# Patient Record
Sex: FEMALE | URBAN - METROPOLITAN AREA
[De-identification: names, ages, dates, MRNs, and addresses within clinical notes are randomized per-mention and may not be internally consistent; named-entity substitution may affect disease eponyms.]

---

## 2020-06-08 ENCOUNTER — OFFICE VISIT (OUTPATIENT)
Dept: URBAN - METROPOLITAN AREA CLINIC 40 | Facility: CLINIC | Age: 80
End: 2020-06-08
Payer: COMMERCIAL

## 2020-06-08 DIAGNOSIS — E11.9 TYPE 2 DIABETES MELLITUS W/O COMPLICATION: Primary | ICD-10-CM

## 2020-06-08 PROCEDURE — 92014 COMPRE OPH EXAM EST PT 1/>: CPT | Performed by: OPTOMETRIST

## 2020-06-08 ASSESSMENT — KERATOMETRY
OD: 42.75
OS: 41.63

## 2020-06-08 ASSESSMENT — INTRAOCULAR PRESSURE
OS: 18
OD: 18

## 2020-06-08 ASSESSMENT — VISUAL ACUITY
OS: 20/70
OD: 20/70

## 2020-06-08 NOTE — IMPRESSION/PLAN
Impression: Type 2 diabetes mellitus w/o complication: Q59.0. Plan: Diabetes type II: no background retinopathy, no signs of neovascularization noted. Discussed ocular and systemic benefits of blood sugar control.

## 2020-06-08 NOTE — IMPRESSION/PLAN
Impression: Age-related nuclear cataract, bilateral: H25.13. Plan: Cataracts account for the patient's complaints. Discussed all risks, benefits, procedures and recovery. Patient understands changing glasses will not improve vision. Patient desires to have surgery, recommend phacoemulsification with intraocular lens.

## 2020-06-23 ENCOUNTER — OFFICE VISIT (OUTPATIENT)
Dept: URBAN - METROPOLITAN AREA CLINIC 40 | Facility: CLINIC | Age: 80
End: 2020-06-23
Payer: COMMERCIAL

## 2020-06-23 PROCEDURE — 99204 OFFICE O/P NEW MOD 45 MIN: CPT | Performed by: OPHTHALMOLOGY

## 2020-06-23 ASSESSMENT — INTRAOCULAR PRESSURE
OD: 17
OS: 17

## 2020-06-23 ASSESSMENT — KERATOMETRY
OS: 41.50
OD: 42.13

## 2020-06-23 NOTE — IMPRESSION/PLAN
Impression: Age-related nuclear cataract, bilateral: H25.13. Evangelist Velazquez Visually significant, quality of life issue, could improve with surgery. Plan: Cataracts account for the patient's complaints. Discussed all risks, benefits, alternatives, procedures and recovery. Patient understands changing glasses will not improve vision. Patient desires to have surgery, recommend phacoemulsification with intraocular lens implant OD.  lvl 2 - pt declines premium IOL -  Re-evaluate OS for possible cataract surgery after OD is done.

## 2020-07-06 ENCOUNTER — TESTING ONLY (OUTPATIENT)
Dept: URBAN - METROPOLITAN AREA CLINIC 40 | Facility: CLINIC | Age: 80
End: 2020-07-06
Payer: COMMERCIAL

## 2020-07-06 DIAGNOSIS — H25.13 AGE-RELATED NUCLEAR CATARACT, BILATERAL: Primary | ICD-10-CM

## 2020-07-06 PROCEDURE — 76519 ECHO EXAM OF EYE: CPT | Performed by: OPHTHALMOLOGY

## 2020-07-06 RX ORDER — PREDNISOLONE ACETATE 10 MG/ML
1 % SUSPENSION/ DROPS OPHTHALMIC
Qty: 10 | Refills: 1 | Status: INACTIVE
Start: 2020-08-11 | End: 2020-09-07

## 2020-07-06 RX ORDER — OFLOXACIN 3 MG/ML
0.3 % SOLUTION/ DROPS OPHTHALMIC
Qty: 5 | Refills: 1 | Status: INACTIVE
Start: 2020-07-06 | End: 2020-08-03

## 2020-07-06 RX ORDER — PREDNISOLONE ACETATE 10 MG/ML
1 % SUSPENSION/ DROPS OPHTHALMIC
Qty: 10 | Refills: 1 | Status: INACTIVE
Start: 2020-07-06 | End: 2020-08-24

## 2020-07-06 RX ORDER — OFLOXACIN 3 MG/ML
0.3 % SOLUTION/ DROPS OPHTHALMIC
Qty: 5 | Refills: 1 | Status: INACTIVE
Start: 2020-08-09 | End: 2020-08-17

## 2020-07-06 RX ORDER — KETOROLAC TROMETHAMINE 5 MG/ML
0.5 % SOLUTION OPHTHALMIC
Qty: 10 | Refills: 1 | Status: INACTIVE
Start: 2020-08-11 | End: 2020-09-07

## 2020-07-06 RX ORDER — KETOROLAC TROMETHAMINE 5 MG/ML
0.5 % SOLUTION OPHTHALMIC
Qty: 10 | Refills: 1 | Status: INACTIVE
Start: 2020-07-06 | End: 2020-08-24

## 2020-07-06 ASSESSMENT — PACHYMETRY
OS: 23.58
OD: 2.60
OD: 23.49
OS: 2.81

## 2020-07-27 ENCOUNTER — SURGERY (OUTPATIENT)
Dept: URBAN - METROPOLITAN AREA SURGERY 11 | Facility: SURGERY | Age: 80
End: 2020-07-27
Payer: COMMERCIAL

## 2020-07-27 PROCEDURE — 66984 XCAPSL CTRC RMVL W/O ECP: CPT | Performed by: OPHTHALMOLOGY

## 2020-07-28 ENCOUNTER — POST-OPERATIVE VISIT (OUTPATIENT)
Dept: URBAN - METROPOLITAN AREA CLINIC 40 | Facility: CLINIC | Age: 80
End: 2020-07-28
Payer: COMMERCIAL

## 2020-07-28 DIAGNOSIS — Z09 ENCNTR FOR F/U EXAM AFT TRTMT FOR COND OTH THAN MALIG NEOPLM: Primary | ICD-10-CM

## 2020-07-28 PROCEDURE — 99024 POSTOP FOLLOW-UP VISIT: CPT | Performed by: OPTOMETRIST

## 2020-07-28 ASSESSMENT — INTRAOCULAR PRESSURE
OS: 15
OD: 14

## 2020-08-04 ENCOUNTER — OFFICE VISIT (OUTPATIENT)
Dept: URBAN - METROPOLITAN AREA CLINIC 40 | Facility: CLINIC | Age: 80
End: 2020-08-04
Payer: COMMERCIAL

## 2020-08-04 DIAGNOSIS — Z96.1 PRESENCE OF INTRAOCULAR LENS: ICD-10-CM

## 2020-08-04 DIAGNOSIS — H25.12 AGE-RELATED NUCLEAR CATARACT, LEFT EYE: Primary | ICD-10-CM

## 2020-08-04 PROCEDURE — 99213 OFFICE O/P EST LOW 20 MIN: CPT | Performed by: OPHTHALMOLOGY

## 2020-08-04 ASSESSMENT — INTRAOCULAR PRESSURE
OD: 17
OS: 17

## 2020-08-04 ASSESSMENT — VISUAL ACUITY
OD: 20/40
OS: 20/70

## 2020-08-04 NOTE — IMPRESSION/PLAN
Impression: Age-related nuclear cataract, left eye: H25.12. Mabel Gardner Visually significant, quality of life issue, could improve with surgery. Plan: Cataracts account for the patient's complaints. Discussed all risks, benefits, alternatives, procedures and recovery. Patient understands changing glasses will not improve vision. Patient desires to have surgery, recommend phacoemulsification with intraocular lens implant OS.   lvl 2 - pt declines premium IOL -

## 2020-08-10 ENCOUNTER — SURGERY (OUTPATIENT)
Dept: URBAN - METROPOLITAN AREA SURGERY 11 | Facility: SURGERY | Age: 80
End: 2020-08-10
Payer: COMMERCIAL

## 2020-08-10 PROCEDURE — 66984 XCAPSL CTRC RMVL W/O ECP: CPT | Performed by: OPHTHALMOLOGY

## 2020-08-11 ENCOUNTER — POST-OPERATIVE VISIT (OUTPATIENT)
Dept: URBAN - METROPOLITAN AREA CLINIC 40 | Facility: CLINIC | Age: 80
End: 2020-08-11
Payer: COMMERCIAL

## 2020-08-11 PROCEDURE — 99024 POSTOP FOLLOW-UP VISIT: CPT | Performed by: OPTOMETRIST

## 2020-08-11 ASSESSMENT — INTRAOCULAR PRESSURE
OS: 18
OD: 16

## 2020-08-19 ENCOUNTER — POST-OPERATIVE VISIT (OUTPATIENT)
Dept: URBAN - METROPOLITAN AREA CLINIC 40 | Facility: CLINIC | Age: 80
End: 2020-08-19
Payer: COMMERCIAL

## 2020-08-19 PROCEDURE — 99024 POSTOP FOLLOW-UP VISIT: CPT | Performed by: OPTOMETRIST

## 2020-08-19 ASSESSMENT — VISUAL ACUITY
OD: 20/25
OS: 20/25

## 2020-08-19 ASSESSMENT — INTRAOCULAR PRESSURE
OD: 14
OS: 13

## 2020-09-10 ENCOUNTER — OFFICE VISIT (OUTPATIENT)
Dept: URBAN - METROPOLITAN AREA CLINIC 40 | Facility: CLINIC | Age: 80
End: 2020-09-10
Payer: COMMERCIAL

## 2020-09-10 DIAGNOSIS — H52.4 PRESBYOPIA: Primary | ICD-10-CM

## 2020-09-10 PROCEDURE — 92014 COMPRE OPH EXAM EST PT 1/>: CPT | Performed by: OPTOMETRIST

## 2020-09-10 ASSESSMENT — VISUAL ACUITY
OD: 20/25
OS: 20/20

## 2022-05-03 ENCOUNTER — OFFICE VISIT (OUTPATIENT)
Dept: URBAN - METROPOLITAN AREA CLINIC 40 | Facility: CLINIC | Age: 82
End: 2022-05-03
Payer: COMMERCIAL

## 2022-05-03 DIAGNOSIS — E11.9 TYPE 2 DIABETES MELLITUS W/O COMPLICATION: Primary | ICD-10-CM

## 2022-05-03 DIAGNOSIS — H04.123 DRY EYE SYNDROME OF BILATERAL LACRIMAL GLANDS: ICD-10-CM

## 2022-05-03 DIAGNOSIS — H26.493 OTHER SECONDARY CATARACT, BILATERAL: ICD-10-CM

## 2022-05-03 PROCEDURE — 99214 OFFICE O/P EST MOD 30 MIN: CPT | Performed by: OPTOMETRIST

## 2022-05-03 ASSESSMENT — VISUAL ACUITY
OS: 20/30
OD: 20/40

## 2022-05-03 ASSESSMENT — KERATOMETRY
OD: 41.50
OS: 41.50

## 2022-05-03 ASSESSMENT — INTRAOCULAR PRESSURE
OS: 12
OD: 13

## 2023-09-11 ENCOUNTER — OFFICE VISIT (OUTPATIENT)
Dept: URBAN - METROPOLITAN AREA CLINIC 40 | Facility: CLINIC | Age: 83
End: 2023-09-11
Payer: COMMERCIAL

## 2023-09-11 DIAGNOSIS — H43.811 VITREOUS DEGENERATION, RIGHT EYE: ICD-10-CM

## 2023-09-11 DIAGNOSIS — H52.4 PRESBYOPIA: ICD-10-CM

## 2023-09-11 DIAGNOSIS — E11.9 TYPE 2 DIABETES MELLITUS WITHOUT COMPLICATIONS: Primary | ICD-10-CM

## 2023-09-11 PROCEDURE — 99214 OFFICE O/P EST MOD 30 MIN: CPT | Performed by: OPTOMETRIST

## 2023-09-11 ASSESSMENT — VISUAL ACUITY
OD: 20/25
OS: 20/30

## 2023-09-11 ASSESSMENT — INTRAOCULAR PRESSURE
OS: 15
OD: 16

## 2025-05-29 ENCOUNTER — HOSPITAL ENCOUNTER (OUTPATIENT)
Age: 85
Discharge: HOME | End: 2025-05-29
Attending: NURSE PRACTITIONER

## 2025-05-29 VITALS
RESPIRATION RATE: 20 BRPM | TEMPERATURE: 98 F | OXYGEN SATURATION: 100 % | HEART RATE: 72 BPM | DIASTOLIC BLOOD PRESSURE: 62 MMHG | SYSTOLIC BLOOD PRESSURE: 182 MMHG

## 2025-05-29 DIAGNOSIS — R03.0 ELEVATED BLOOD PRESSURE READING: ICD-10-CM

## 2025-05-29 DIAGNOSIS — N30.01 ACUTE CYSTITIS WITH HEMATURIA: ICD-10-CM

## 2025-05-29 DIAGNOSIS — R30.0 DYSURIA: Primary | ICD-10-CM

## 2025-05-29 LAB
GLUCOSE UR STRIP-MCNC: NEGATIVE MG/DL
KETONES UR STRIP-MCNC: 15 MG/DL
NITRITE UR QL STRIP: NEGATIVE
PH UR STRIP: 5.5 [PH]
PROT UR STRIP-MCNC: >=300 MG/DL
SP GR UR STRIP: 1.02
UROBILINOGEN UR STRIP-ACNC: <2 MG/DL

## 2025-05-29 PROCEDURE — 87077 CULTURE AEROBIC IDENTIFY: CPT | Performed by: NURSE PRACTITIONER

## 2025-05-29 PROCEDURE — 87186 SC STD MICRODIL/AGAR DIL: CPT | Performed by: NURSE PRACTITIONER

## 2025-05-29 PROCEDURE — 81002 URINALYSIS NONAUTO W/O SCOPE: CPT | Performed by: NURSE PRACTITIONER

## 2025-05-29 PROCEDURE — 87086 URINE CULTURE/COLONY COUNT: CPT | Performed by: NURSE PRACTITIONER

## 2025-05-29 PROCEDURE — 99204 OFFICE O/P NEW MOD 45 MIN: CPT | Performed by: NURSE PRACTITIONER

## 2025-05-29 RX ORDER — CEPHALEXIN 500 MG/1
500 CAPSULE ORAL 2 TIMES DAILY
Qty: 14 CAPSULE | Refills: 0 | Status: SHIPPED | OUTPATIENT
Start: 2025-05-29 | End: 2025-06-05

## 2025-05-29 NOTE — ED PROVIDER NOTES
Patient Seen in: [facility]        History  Chief Complaint   Patient presents with    Urinary Symptoms     Stated Complaint: urinary symptoms    Subjective:   Well-appearing 84-year-old female with diabetes mellitus, essential hypertension and hyperlipidemia presents with complaints of pain with urination, urinary urgency and frequency for the past 2 days.  Patient communicates that she noticed blood-streaked urine with wiping today morning.  Patient denies gross hematuria or clots.  Patient denies fever or chills.  Patient denies abdominal or back pain.  Patient communicates normal appetite/p.o. intake.  Patient does communicate that she did not take her daily dose of lisinopril today morning.              Objective:     Past Medical History:    Diabetes (HCC)    Essential hypertension    Hyperlipidemia              Past Surgical History:   Procedure Laterality Date    Breast surgery      Bilateral masectomy 11/18/18, Breast CA                Social History     Socioeconomic History    Marital status:    Tobacco Use    Smoking status: Never     Passive exposure: Never    Smokeless tobacco: Never              Review of Systems    Positive for stated complaint: urinary symptoms  Other systems are as noted in HPI.  Constitutional and vital signs reviewed.      All other systems reviewed and negative except as noted above.      Physical Exam    ED Triage Vitals [05/29/25 1039]   BP (!) 182/64   Pulse 79   Resp 22   Temp 98.3 °F (36.8 °C)   Temp src Oral   SpO2 100 %   O2 Device None (Room air)       Current Vitals:   Vital Signs  BP: (!) 182/62 (pt will take BP medicaitons when she gets home)  Pulse: 72  Resp: 20  Temp: 98.3 °F (36.8 °C)  Temp src: Oral    Oxygen Therapy  SpO2: 100 %  O2 Device: None (Room air)            Physical Exam  VS: Vital signs reviewed. 02 saturation within normal limits for this patient.    General: Patient is awake and alert, oriented to person, place and time. Pt  appears non-toxic.     HEENT: Head is normocephalic, atraumatic. Nonicteric sclera, no conjunctival injection. No facial droop or slurred speech. No oral lesions or pallor. Mucous membranes moist.     Neck: Supple. Normal ROM.    Lungs: Good inspiratory effort. No accessory muscle use or tachypnea.    Abdomen: Soft, nontender, non-distended.  There is no right or left CVA tenderness.    Back: Normal inspection, no tenderness.     Extremities: No focal swelling or tenderness. Capillary refill noted.     Skin: Warm, dry and normal in color.     Psychiatric: Normal affect, judgement normal, insight normal.     CNS: Moves all 4 extremities. Interacts appropriately. No gait abnormality. Memory normal.    ED Course  Labs Reviewed   [facility] POCT URINALYSIS DIPSTICK - Abnormal; Notable for the following components:       Result Value    Urine Color Orange (*)     Urine Clarity Cloudy (*)     Protein urine >=300 (*)     Ketone, Urine 15 (*)     Bilirubin, Urine Moderate (*)     Blood, Urine Large (*)     Leukocyte esterase urine Large (*)     All other components within normal limits   URINE CULTURE, ROUTINE      MDM    Medical Decision Making  Well-appearing.  Urine dip is positive for blood and leukocyte esterase.  Urine culture pending.   Will treat for presumed acute cystitis based on signs and symptoms with cephalexin 2 times daily x 7 days.  I discussed the importance of taking daily medications with patient.    Differential diagnosis considered included acute cystitis versus overactive bladder versus pyelonephritis versus nephrolithiasis.  PMD follow-up as needed.  Return precautions discussed      Problems Addressed:  Acute cystitis with hematuria: acute illness or injury  Dysuria: acute illness or injury  Elevated blood pressure reading: acute illness or injury    Amount and/or Complexity of Data Reviewed  Labs: ordered. Decision-making details documented in ED Course.    Risk  OTC drugs.  Prescription drug  management.        Disposition and Plan     Clinical Impression:  1. Dysuria    2. Acute cystitis with hematuria    3. Elevated blood pressure reading         Disposition:  Discharge  5/29/2025 11:03 am    Follow-up:  Keisha Nelson MD  [address] [id number]  [phone]      As needed          Medications Prescribed:  Discharge Medication List as of 5/29/2025 11:13 AM        START taking these medications    Details   cephALEXin 500 MG Oral Cap Take 1 capsule (500 mg total) by mouth 2 (two) times daily for 7 days., Normal, Disp-14 capsule, R-0                   Supplementary Documentation:                                                                  I will SWITCH the dose or number of times a day I take the medications listed below when I get home from the hospital:  None

## 2025-05-29 NOTE — ED INITIAL ASSESSMENT (HPI)
Pt presents with urinary urgency, frequency and burning with urination. Blood in urine. No fever.     No low back pain.     Pt reports \"BP is high because I didn't take my medication today\", per pt.

## 2025-05-31 RX ORDER — NITROFURANTOIN 25; 75 MG/1; MG/1
100 CAPSULE ORAL 2 TIMES DAILY
Qty: 14 CAPSULE | Refills: 0 | Status: SHIPPED | OUTPATIENT
Start: 2025-05-31 | End: 2025-06-07